# Patient Record
Sex: FEMALE | Race: BLACK OR AFRICAN AMERICAN
[De-identification: names, ages, dates, MRNs, and addresses within clinical notes are randomized per-mention and may not be internally consistent; named-entity substitution may affect disease eponyms.]

---

## 2020-01-29 ENCOUNTER — APPOINTMENT (OUTPATIENT)
Dept: OBGYN | Facility: CLINIC | Age: 55
End: 2020-01-29

## 2022-01-18 PROBLEM — Z00.00 ENCOUNTER FOR PREVENTIVE HEALTH EXAMINATION: Status: ACTIVE | Noted: 2022-01-18

## 2022-01-20 ENCOUNTER — APPOINTMENT (OUTPATIENT)
Dept: OBGYN | Facility: CLINIC | Age: 57
End: 2022-01-20
Payer: MEDICARE

## 2022-01-20 VITALS
BODY MASS INDEX: 38.89 KG/M2 | HEIGHT: 66 IN | DIASTOLIC BLOOD PRESSURE: 82 MMHG | WEIGHT: 242 LBS | SYSTOLIC BLOOD PRESSURE: 120 MMHG

## 2022-01-20 DIAGNOSIS — N93.9 ABNORMAL UTERINE AND VAGINAL BLEEDING, UNSPECIFIED: ICD-10-CM

## 2022-01-20 DIAGNOSIS — D25.9 LEIOMYOMA OF UTERUS, UNSPECIFIED: ICD-10-CM

## 2022-01-20 PROCEDURE — 99243 OFF/OP CNSLTJ NEW/EST LOW 30: CPT

## 2022-01-20 PROCEDURE — 99203 OFFICE O/P NEW LOW 30 MIN: CPT

## 2022-01-20 NOTE — PHYSICAL EXAM
[Chaperone Present] : A chaperone was present in the examining room during all aspects of the physical examination [Enlarged ___ wks] : enlarged [unfilled] ~Uweeks

## 2022-01-20 NOTE — HISTORY OF PRESENT ILLNESS
[FreeTextEntry1] : 55yo LMP 2022 with fibroids, adenomyosis, and prolonged vaginal bleeding. Prior to this, pt reports 3 months of amenorrhea. She is receiving IV iron transfusion for known anemia. Feeling lightheaded occasionally, Hgb usually around 9mg/dL. (+) Pain/cramping. No changes in urination or BMs. No changes in appetite/weight gain or loss \par \par In the past, pt s/p Depo-Provera for management of her bleeding and pain (12 years ago) with relief. \par \par Hematologist: ([facility]) \par Pulmonologist: ([facility]) \par \par OBHx: \par -  - 3NSVD (avg 7lbs), 3sAb s/p D&C x1 \par GYNHx: \par - Menarche: 14, [id number], lasts 4-6days\par - (+) adenomyosis, (+) fibroids \par PMH:\par - Sarcoidosis \par - Obesity \par - Asthma\par PSxH:\par - umbilical hernia repair (age 17, unk if mesh placed)\par Meds:\par - Albuterol\par Allergies: PCN (angioedema) \par Social Hx: wine occasional\par FHx: pt is adopted \par \par \par Imaging: \par () Office TVUS: 13.5cm Ut, Posterior SS 6cm; Fundal SS 4.0cm, EE 17mm, normal ovaries, R Ov 2.5cm simple cyst \par \par () Outpatient [facility] MRI P: Ut 13.4cm, EE 6mm\par - Posterior IM/SS 7cm\par - Multiple foci of adenomyosis with masslike focis of junctional zone thickening \par - Anterior R side adenomyoma 5.4cm\par - L postioer adenomyoma 6cm\par - R posterior adenomyome 2.6cm\par - R anterior junctional zone is normal, 5mm \par R ovary with multiple follicles, 6mm hemmorhagic cyst \par - nL L ovary\par - Bilateral Bartholins cysts 2.5cm and 1.6cm \par \par Pathology: \par (2021) EMB: atrophic/inactive endometrium\par Pap: KEREN \par

## 2022-01-20 NOTE — PLAN
[FreeTextEntry1] : Patient with known fibroid uterus and adenomyosis discussed the options of conservative management versus definitive surgical procedure was with [facility] bilateral salpingectomy patient is to check FSH estradiol and to have pelvic ultrasound should patient have no signs of impending menopause patient will proceed with [facility] BS

## 2022-01-24 ENCOUNTER — APPOINTMENT (OUTPATIENT)
Dept: ULTRASOUND IMAGING | Facility: CLINIC | Age: 57
End: 2022-01-24

## 2022-01-24 ENCOUNTER — RESULT REVIEW (OUTPATIENT)
Age: 57
End: 2022-01-24

## 2022-01-24 ENCOUNTER — OUTPATIENT (OUTPATIENT)
Dept: OUTPATIENT SERVICES | Facility: HOSPITAL | Age: 57
LOS: 1 days | End: 2022-01-24
Payer: MEDICARE

## 2022-01-24 DIAGNOSIS — Z00.8 ENCOUNTER FOR OTHER GENERAL EXAMINATION: ICD-10-CM

## 2022-01-24 PROCEDURE — 76856 US EXAM PELVIC COMPLETE: CPT | Mod: 26

## 2022-01-24 PROCEDURE — 76830 TRANSVAGINAL US NON-OB: CPT | Mod: 26

## 2022-01-24 PROCEDURE — 76856 US EXAM PELVIC COMPLETE: CPT

## 2022-01-24 PROCEDURE — 76830 TRANSVAGINAL US NON-OB: CPT

## 2022-07-26 ENCOUNTER — RESULT REVIEW (OUTPATIENT)
Age: 57
End: 2022-07-26

## 2022-11-09 ENCOUNTER — RESULT REVIEW (OUTPATIENT)
Age: 57
End: 2022-11-09

## 2024-01-03 ENCOUNTER — APPOINTMENT (OUTPATIENT)
Dept: ORTHOPEDIC SURGERY | Facility: CLINIC | Age: 59
End: 2024-01-03
Payer: MEDICARE

## 2024-01-03 DIAGNOSIS — M25.462 EFFUSION, LEFT KNEE: ICD-10-CM

## 2024-01-03 DIAGNOSIS — M17.0 BILATERAL PRIMARY OSTEOARTHRITIS OF KNEE: ICD-10-CM

## 2024-01-03 PROCEDURE — 20610 DRAIN/INJ JOINT/BURSA W/O US: CPT | Mod: LT

## 2024-01-03 PROCEDURE — 99204 OFFICE O/P NEW MOD 45 MIN: CPT | Mod: 25

## 2024-01-03 PROCEDURE — 73562 X-RAY EXAM OF KNEE 3: CPT | Mod: 50

## 2024-01-03 PROCEDURE — J3490M: CUSTOM | Mod: NC

## 2024-01-03 RX ORDER — MELOXICAM 15 MG/1
15 TABLET ORAL
Qty: 30 | Refills: 1 | Status: ACTIVE | COMMUNITY
Start: 2024-01-03 | End: 1900-01-01

## 2024-01-03 NOTE — ASSESSMENT
[FreeTextEntry1] : will aspirate and administer csi to the left knee as it is more problematic start on mobic f/u Dr Barriga 3 weeks  possible right knee vs start ha series

## 2024-01-03 NOTE — IMAGING
[Bilateral] : knee bilaterally [AP] : anteroposterior [Lateral] : lateral [[place]] : skyline [advanced tricompartmental OA with medial compartment narrowing and varus alignment] : advanced tricompartmental OA with medial compartment narrowing and varus alignment

## 2024-01-03 NOTE — PHYSICAL EXAM
[Left] : left knee [NL (0)] : extension 0 degrees [5___] : hamstring 5[unfilled]/5 [Equivocal] : equivocal [place] [] : negative Lachmann [TWNoteComboBox7] : flexion 125 degrees

## 2024-01-03 NOTE — PROCEDURE
[Large Joint Injection] : Large joint injection [Left] : of the left [Knee] : knee [Pain] : pain [Inflammation] : inflammation [X-ray evidence of Osteoarthritis on this or prior visit] : x-ray evidence of Osteoarthritis on this or prior visit [Alcohol] : alcohol [Betadine] : betadine [Ethyl Chloride sprayed topically] : ethyl chloride sprayed topically [Sterile technique used] : sterile technique used [___ cc    6mg] :  Betamethasone (Celestone) ~Vcc of 6mg [___ cc    0.5%] : Bupivacaine (Marcaine) ~Vcc of 0.5%  [] : Patient tolerated procedure well [Call if redness, pain or fever occur] : call if redness, pain or fever occur [Apply ice for 15min out of every hour for the next 12-24 hours as tolerated] : apply ice for 15 minutes out of every hour for the next 12-24 hours as tolerated [Patient was advised to rest the joint(s) for ____ days] : patient was advised to rest the joint(s) for [unfilled] days [Previous OTC use and PT nontherapeutic] : patient has tried OTC's including aspirin, Ibuprofen, Aleve, etc or prescription NSAIDS, and/or exercises at home and/or physical therapy without satisfactory response [Patient had decreased mobility in the joint] : patient had decreased mobility in the joint [Risks, benefits, alternatives discussed / Verbal consent obtained] : the risks benefits, and alternatives have been discussed, and verbal consent was obtained [Effusion] : effusion [de-identified] : 35cc clear yellow

## 2024-01-03 NOTE — HISTORY OF PRESENT ILLNESS
[8] : 8 [Dull/Aching] : dull/aching [Localized] : localized [Intermittent] : intermittent [Standing] : standing [Walking] : walking [Stairs] : stairs [Retired] : Work status: retired [de-identified] :  01/03/2024: states history of oa in years past did well with conserv treatments never had inj. Was on a cruise last week developed pain and swelling both knees left worse than right. now ambulating with a cane retired  [facility] sarcoidosis [] : Post Surgical Visit: no [FreeTextEntry1] : amadou knee [FreeTextEntry5] : Patient complains of knee pain. states she just came off a cruise ship and states her left knee has buckling, is using a cane to ambulate, H/O arthritis in both knees, left knee is worse than right. [de-identified] : Doctor in [place]

## 2024-01-23 ENCOUNTER — APPOINTMENT (OUTPATIENT)
Dept: ORTHOPEDIC SURGERY | Facility: CLINIC | Age: 59
End: 2024-01-23
Payer: MEDICARE

## 2024-01-23 VITALS — HEIGHT: 66 IN | WEIGHT: 240 LBS | BODY MASS INDEX: 38.57 KG/M2

## 2024-01-23 DIAGNOSIS — Z86.2 PERSONAL HISTORY OF DISEASES OF THE BLOOD AND BLOOD-FORMING ORGANS AND CERTAIN DISORDERS INVOLVING THE IMMUNE MECHANISM: ICD-10-CM

## 2024-01-23 DIAGNOSIS — Z78.9 OTHER SPECIFIED HEALTH STATUS: ICD-10-CM

## 2024-01-23 PROCEDURE — 99203 OFFICE O/P NEW LOW 30 MIN: CPT

## 2024-01-23 RX ORDER — MELOXICAM 15 MG/1
15 TABLET ORAL
Qty: 30 | Refills: 0 | Status: ACTIVE | COMMUNITY
Start: 2024-01-23 | End: 1900-01-01

## 2024-01-23 NOTE — ASSESSMENT
[FreeTextEntry1] : 58 year F WITH MODERATE B/L KNEE PAIN, L>R. WENT TO [facility] ON 1/3/24 AND WAS GIVEN LT KNEE CSI WITH GOOD RELIEF. PAIN WORSENS WITH STAIRS AND WALKING PROLONGED DISTANCES. PAIN IS AFFECTING ADL AND FUNCTIONAL ACTIVITIES. XRAYS FROM 1/3/24 REVIEWED WITH ADVANCED TRICOMPARTMENTAL OA. TREATMENT OPTIONS REVIEWED. QUESTIONS ANSWERED.   PMHx: SARCOIDOSIS

## 2024-01-23 NOTE — PHYSICAL EXAM
[Bilateral] : knee bilaterally [5___] : hamstring 5[unfilled]/5 [] : ambulation with cane [FreeTextEntry8] : L>R [TWNoteComboBox7] : flexion 125 degrees [de-identified] : extension 0 degrees

## 2024-01-23 NOTE — IMAGING
[Bilateral] : knee bilaterally [AP] : anteroposterior [Lateral] : lateral [[place]] : skyline [advanced tricompartmental OA with medial compartment narrowing and varus alignment] : advanced tricompartmental OA with medial compartment narrowing and varus alignment

## 2024-01-23 NOTE — HISTORY OF PRESENT ILLNESS
[Gradual] : gradual [5] : 5 [4] : 4 [Localized] : localized [Intermittent] : intermittent [Household chores] : household chores [Injection therapy] : injection therapy [Rest] : rest [Walking] : walking [Stairs] : stairs [de-identified] : 1/23/24: 58F here for states history of oa in years past did well with conserv treatments never had inj. Was on a cruise last week developed pain and swelling both knees left worse than right. now ambulating with a cane retired HAD CSI DONE BY BO HAYES LAST VISIT WITH GOOD RELIEF ON THE LEFT KNEE  [] : no [FreeTextEntry1] : KNEES [de-identified] : BO HAYES RPA  [de-identified] : X RAYS DONE AT [facility] [de-identified] : CSI

## 2024-02-06 ENCOUNTER — APPOINTMENT (OUTPATIENT)
Dept: ORTHOPEDIC SURGERY | Facility: CLINIC | Age: 59
End: 2024-02-06
Payer: MEDICARE

## 2024-02-06 DIAGNOSIS — M17.12 UNILATERAL PRIMARY OSTEOARTHRITIS, LEFT KNEE: ICD-10-CM

## 2024-02-06 DIAGNOSIS — M17.11 UNILATERAL PRIMARY OSTEOARTHRITIS, RIGHT KNEE: ICD-10-CM

## 2024-02-06 PROCEDURE — 99214 OFFICE O/P EST MOD 30 MIN: CPT

## 2024-02-06 RX ORDER — CELECOXIB 200 MG/1
200 CAPSULE ORAL DAILY
Qty: 30 | Refills: 1 | Status: ACTIVE | COMMUNITY
Start: 2024-02-06 | End: 1900-01-01

## 2024-02-06 NOTE — IMAGING
[Bilateral] : knee bilaterally [AP] : anteroposterior [Lateral] : lateral [[place]] : skyline [advanced tricompartmental OA with medial compartment narrowing and varus alignment] : advanced tricompartmental OA with medial compartment narrowing and varus alignment

## 2024-02-06 NOTE — HISTORY OF PRESENT ILLNESS
[Gradual] : gradual [5] : 5 [4] : 4 [Localized] : localized [Intermittent] : intermittent [Household chores] : household chores [Rest] : rest [Injection therapy] : injection therapy [Walking] : walking [Stairs] : stairs [de-identified] : 1/23/24: 58F here for states history of oa in years past did well with conserv treatments never had inj. Was on a cruise last week developed pain and swelling both knees left worse than right. now ambulating with a cane retired HAD CSI DONE BY BO HAYES LAST VISIT WITH GOOD RELIEF ON THE LEFT KNEE   02/06/24 FOLLOW UP BL KNEES ,STILL IN PAIN  [] : no [FreeTextEntry1] : KNEES [de-identified] : BO HAYES RPA  [de-identified] : X RAYS DONE AT [facility] [de-identified] : CSI

## 2024-02-06 NOTE — ASSESSMENT
[FreeTextEntry1] : 58 year F WITH MODERATE B/L KNEE PAIN, L>R. WENT TO [facility] 1/3/24 AND WAS GIVEN LT KNEE CSI WITH GOOD RELIEF. PAIN WORSENS WITH STAIRS AND WALKING PROLONGED DISTANCES. PAIN IS AFFECTING ADL AND FUNCTIONAL ACTIVITIES. XRAYS FROM 1/3/24 REVIEWED WITH ADVANCED TRICOMPARTMENTAL OA. TREATMENT OPTIONS REVIEWED. QUESTIONS ANSWERED.   ADD PT AND HKB LEFT TODAY CONSIDER VISCO  PMHx: SARCOIDOSIS

## 2024-02-06 NOTE — PHYSICAL EXAM
[Bilateral] : knee bilaterally [5___] : hamstring 5[unfilled]/5 [] : no effusion [FreeTextEntry8] : L>R [TWNoteComboBox7] : flexion 125 degrees [de-identified] : extension 0 degrees

## 2024-04-02 ENCOUNTER — APPOINTMENT (OUTPATIENT)
Dept: ORTHOPEDIC SURGERY | Facility: CLINIC | Age: 59
End: 2024-04-02

## 2024-11-27 ENCOUNTER — APPOINTMENT (OUTPATIENT)
Dept: PHYSICAL MEDICINE AND REHAB | Facility: CLINIC | Age: 59
End: 2024-11-27